# Patient Record
Sex: FEMALE | Race: WHITE | URBAN - METROPOLITAN AREA
[De-identification: names, ages, dates, MRNs, and addresses within clinical notes are randomized per-mention and may not be internally consistent; named-entity substitution may affect disease eponyms.]

---

## 2017-07-03 ENCOUNTER — HOSPITAL ENCOUNTER (OUTPATIENT)
Dept: ENDOSCOPY | Age: 57
Discharge: HOME | End: 2017-07-03
Attending: INTERNAL MEDICINE | Admitting: INTERNAL MEDICINE

## 2018-05-07 ENCOUNTER — PAT TELEPHONE (OUTPATIENT)
Dept: PREADMISSION TESTING | Age: 58
End: 2018-05-07

## 2018-05-07 VITALS — HEIGHT: 68 IN | WEIGHT: 165 LBS | BODY MASS INDEX: 25.01 KG/M2

## 2018-05-10 RX ORDER — ONDANSETRON 2 MG/ML
4 INJECTION INTRAMUSCULAR; INTRAVENOUS
Status: ACTIVE | OUTPATIENT
Start: 2018-05-10 | End: 2018-05-10

## 2018-05-10 RX ORDER — OXYCODONE HYDROCHLORIDE AND ACETAMINOPHEN 5; 325 MG/1; MG/1
1 TABLET ORAL PRN
Status: ACTIVE | OUTPATIENT
Start: 2018-05-10 | End: 2018-05-10

## 2018-05-10 RX ORDER — FENTANYL CITRATE 50 UG/ML
50 INJECTION, SOLUTION INTRAMUSCULAR; INTRAVENOUS EVERY 5 MIN PRN
Status: DISCONTINUED | OUTPATIENT
Start: 2018-05-10 | End: 2018-05-15 | Stop reason: HOSPADM

## 2018-05-10 RX ORDER — MORPHINE SULFATE 4 MG/ML
2 INJECTION, SOLUTION INTRAMUSCULAR; INTRAVENOUS EVERY 5 MIN PRN
Status: DISCONTINUED | OUTPATIENT
Start: 2018-05-10 | End: 2018-05-15 | Stop reason: HOSPADM

## 2018-05-10 RX ORDER — MORPHINE SULFATE 4 MG/ML
1 INJECTION, SOLUTION INTRAMUSCULAR; INTRAVENOUS EVERY 5 MIN PRN
Status: DISCONTINUED | OUTPATIENT
Start: 2018-05-10 | End: 2018-05-15 | Stop reason: HOSPADM

## 2018-05-10 RX ORDER — FENTANYL CITRATE 50 UG/ML
25 INJECTION, SOLUTION INTRAMUSCULAR; INTRAVENOUS EVERY 5 MIN PRN
Status: DISCONTINUED | OUTPATIENT
Start: 2018-05-10 | End: 2018-05-15 | Stop reason: HOSPADM

## 2018-05-10 RX ORDER — OXYCODONE HYDROCHLORIDE AND ACETAMINOPHEN 5; 325 MG/1; MG/1
2 TABLET ORAL PRN
Status: ACTIVE | OUTPATIENT
Start: 2018-05-10 | End: 2018-05-10

## 2018-05-10 RX ORDER — MEPERIDINE HYDROCHLORIDE 25 MG/ML
12.5 INJECTION INTRAMUSCULAR; INTRAVENOUS; SUBCUTANEOUS EVERY 5 MIN PRN
Status: DISCONTINUED | OUTPATIENT
Start: 2018-05-10 | End: 2018-05-15 | Stop reason: HOSPADM

## 2018-05-10 ASSESSMENT — LIFESTYLE VARIABLES: SMOKING_STATUS: 0

## 2018-05-14 ENCOUNTER — HOSPITAL ENCOUNTER (OUTPATIENT)
Dept: ENDOSCOPY | Age: 58
Discharge: HOME | End: 2018-05-14
Attending: INTERNAL MEDICINE | Admitting: INTERNAL MEDICINE

## 2018-05-14 VITALS
HEIGHT: 68 IN | SYSTOLIC BLOOD PRESSURE: 142 MMHG | OXYGEN SATURATION: 100 % | BODY MASS INDEX: 26.28 KG/M2 | RESPIRATION RATE: 14 BRPM | HEART RATE: 51 BPM | WEIGHT: 173.4 LBS | TEMPERATURE: 97.6 F | DIASTOLIC BLOOD PRESSURE: 83 MMHG

## 2018-05-14 DIAGNOSIS — Z12.11 ENCOUNTER FOR SCREENING FOR MALIGNANT NEOPLASM OF COLON: ICD-10-CM

## 2018-05-14 RX ORDER — SODIUM CHLORIDE 0.9 % (FLUSH) 0.9 %
10 SYRINGE (ML) INJECTION PRN
Status: DISCONTINUED | OUTPATIENT
Start: 2018-05-14 | End: 2018-05-15 | Stop reason: HOSPADM

## 2018-05-14 RX ORDER — SODIUM CHLORIDE 0.9 % (FLUSH) 0.9 %
10 SYRINGE (ML) INJECTION EVERY 12 HOURS SCHEDULED
Status: DISCONTINUED | OUTPATIENT
Start: 2018-05-14 | End: 2018-05-15 | Stop reason: HOSPADM

## 2018-05-14 RX ORDER — SODIUM CHLORIDE 9 MG/ML
INJECTION, SOLUTION INTRAVENOUS CONTINUOUS
Status: DISCONTINUED | OUTPATIENT
Start: 2018-05-14 | End: 2018-05-15 | Stop reason: HOSPADM

## 2018-05-14 RX ADMIN — SODIUM CHLORIDE: 9 INJECTION, SOLUTION INTRAVENOUS at 10:20

## 2018-05-14 ASSESSMENT — PAIN SCALES - GENERAL
PAINLEVEL_OUTOF10: 0

## 2018-05-14 ASSESSMENT — PAIN - FUNCTIONAL ASSESSMENT: PAIN_FUNCTIONAL_ASSESSMENT: 0-10

## 2025-06-09 ENCOUNTER — OFFICE VISIT (OUTPATIENT)
Dept: FAMILY MEDICINE CLINIC | Age: 65
End: 2025-06-09
Payer: COMMERCIAL

## 2025-06-09 VITALS
RESPIRATION RATE: 18 BRPM | HEIGHT: 68 IN | HEART RATE: 71 BPM | OXYGEN SATURATION: 96 % | SYSTOLIC BLOOD PRESSURE: 134 MMHG | DIASTOLIC BLOOD PRESSURE: 88 MMHG | WEIGHT: 172 LBS | BODY MASS INDEX: 26.07 KG/M2

## 2025-06-09 DIAGNOSIS — E06.3 HYPOTHYROIDISM DUE TO HASHIMOTO THYROIDITIS: ICD-10-CM

## 2025-06-09 DIAGNOSIS — Z00.00 ENCOUNTER FOR ADULT WELLNESS VISIT: Primary | ICD-10-CM

## 2025-06-09 DIAGNOSIS — Z12.31 ENCOUNTER FOR SCREENING MAMMOGRAM FOR BREAST CANCER: ICD-10-CM

## 2025-06-09 DIAGNOSIS — R01.1 HEART MURMUR: ICD-10-CM

## 2025-06-09 PROCEDURE — 99386 PREV VISIT NEW AGE 40-64: CPT | Performed by: INTERNAL MEDICINE

## 2025-06-09 RX ORDER — LEVOTHYROXINE SODIUM 100 UG/1
100 TABLET ORAL DAILY
Qty: 90 TABLET | Refills: 3 | Status: SHIPPED | OUTPATIENT
Start: 2025-06-09

## 2025-06-09 SDOH — ECONOMIC STABILITY: FOOD INSECURITY: WITHIN THE PAST 12 MONTHS, YOU WORRIED THAT YOUR FOOD WOULD RUN OUT BEFORE YOU GOT MONEY TO BUY MORE.: NEVER TRUE

## 2025-06-09 SDOH — ECONOMIC STABILITY: FOOD INSECURITY: WITHIN THE PAST 12 MONTHS, THE FOOD YOU BOUGHT JUST DIDN'T LAST AND YOU DIDN'T HAVE MONEY TO GET MORE.: NEVER TRUE

## 2025-06-09 ASSESSMENT — PATIENT HEALTH QUESTIONNAIRE - PHQ9
SUM OF ALL RESPONSES TO PHQ QUESTIONS 1-9: 0
1. LITTLE INTEREST OR PLEASURE IN DOING THINGS: NOT AT ALL
SUM OF ALL RESPONSES TO PHQ QUESTIONS 1-9: 0
SUM OF ALL RESPONSES TO PHQ QUESTIONS 1-9: 0
2. FEELING DOWN, DEPRESSED OR HOPELESS: NOT AT ALL
SUM OF ALL RESPONSES TO PHQ QUESTIONS 1-9: 0

## 2025-06-09 NOTE — PROGRESS NOTES
may have errors due to use of this software. Please reach out if any questions about contents of this note.       The patient (or guardian, if applicable) and other individuals in attendance with the patient were advised that Artificial Intelligence will be utilized during this visit to record, process the conversation to generate a clinical note, and support improvement of the AI technology. The patient (or guardian, if applicable) and other individuals in attendance at the appointment consented to the use of AI, including the recording.        Devon Cruz MD  Med[facility]: [place] [address]  Office Phone: [phone]  Office Fax: [phone]

## 2025-06-09 NOTE — PATIENT INSTRUCTIONS
For a vit D supplement, I recommend 20-25mcg or 800-1000 IU of Vitamin D2 or D3       To schedule your mammogram please call our scheduling number at [phone]        This is one of my passion topics because management of hypothyroidism can seem simple, but is based on some complex feedback systems.     I generally only use measurements of TSH (thyroid stimulating hormone) to determine adjustments thyroid replacement medications. Levothyroxine is a bio-identical medication, meaning it is molecularly exactly the same as thyroid hormone made in the human body. So with people who have hypothyroid due to problems with their thyroid gland (which is seen when you have high TSH and low thyroid hormone on initial diagnosis), we are giving thyroid hormone supplements and because it is the same molecule, it generates the same feedback loop to your hypothalamus and pituitary gland, which control how much TSH is released by the pituitary gland and tells your thyroid how much thyroid it should be releasing.     Levothyroxine (which is T4) changes from T4 to T3 at the tissue level of different, so blood samples of their levels are not really an accurate representation of what their concentrations are at where they actually function. Thus, they're useful for initial diagnosis, but then don't give us the right information on how to manage thyroid medications. Equally, T3 is made by conversion from T4, which happens outside the     Because the hypothalamus and pituitary gland are responding to T4 and T3 at the tissue level where they are being converted back and forth through the day, the TSH response produced does more accurately tell us thyroid hormone levels (T3 and T4) through this indirect measure (which is also how the thyroid gland is able to keep track of if it is making enough thyroid hormone in a person who has a healthy, normally functioning thyroid).